# Patient Record
Sex: MALE | Race: BLACK OR AFRICAN AMERICAN
[De-identification: names, ages, dates, MRNs, and addresses within clinical notes are randomized per-mention and may not be internally consistent; named-entity substitution may affect disease eponyms.]

---

## 2017-11-13 ENCOUNTER — HOSPITAL ENCOUNTER (OUTPATIENT)
Dept: HOSPITAL 53 - M RAD | Age: 42
End: 2017-11-13
Attending: SURGERY
Payer: COMMERCIAL

## 2017-11-13 DIAGNOSIS — M51.36: ICD-10-CM

## 2017-11-13 DIAGNOSIS — M16.11: Primary | ICD-10-CM

## 2017-11-13 DIAGNOSIS — M25.78: ICD-10-CM

## 2017-11-13 NOTE — REP
Clinical:  Lower back pain.

 

Technique:  AP, lateral, bilateral oblique and coned-down views of the

lumbosacral spine.

 

Findings:

Straightening of normal lordosis may be secondary to positioning versus

pain/spasm.  Mild degenerative changes include multilevel anterior osteophytes

with minimal endplate sclerosis.  Subtle disc space narrowing at the L4-5 level

is suggested.  No acute fracture / compression injury or subluxation.

 

Impression:

Mild multilevel degenerative changes.

 

 

Signed by

Betito Martinez MD 11/13/2017 11:01 A

## 2017-11-13 NOTE — REP
Clinical:  Right hip pain.

 

Technique:  Neutral and frog lateral views of the right hip.

 

Findings:

Irregularity and free calcifications adjacent to the posterior aspect of the

acetabulum suggests chronic degenerative change.  No acute fracture or

dislocation.

 

Impression:

Chronic degenerative changes suggested primarily involving the posterior aspect

of the acetabulum.

 

 

Signed by

Betito Martinez MD 11/13/2017 11:00 A

## 2018-01-23 ENCOUNTER — HOSPITAL ENCOUNTER (OUTPATIENT)
Dept: HOSPITAL 53 - M RAD | Age: 43
End: 2018-01-23
Attending: SURGERY
Payer: COMMERCIAL

## 2018-01-23 DIAGNOSIS — M51.26: Primary | ICD-10-CM

## 2018-01-23 PROCEDURE — 72148 MRI LUMBAR SPINE W/O DYE: CPT

## 2018-08-16 ENCOUNTER — HOSPITAL ENCOUNTER (OUTPATIENT)
Dept: HOSPITAL 53 - M RAD | Age: 43
End: 2018-08-16
Attending: SURGERY
Payer: COMMERCIAL

## 2018-08-16 DIAGNOSIS — M25.512: Primary | ICD-10-CM

## 2018-08-16 PROCEDURE — 73030 X-RAY EXAM OF SHOULDER: CPT
